# Patient Record
Sex: MALE | Race: WHITE
[De-identification: names, ages, dates, MRNs, and addresses within clinical notes are randomized per-mention and may not be internally consistent; named-entity substitution may affect disease eponyms.]

---

## 2019-01-05 ENCOUNTER — HOSPITAL ENCOUNTER (EMERGENCY)
Dept: HOSPITAL 10 - FTE | Age: 54
Discharge: HOME | End: 2019-01-05
Payer: COMMERCIAL

## 2019-01-05 ENCOUNTER — HOSPITAL ENCOUNTER (EMERGENCY)
Dept: HOSPITAL 91 - FTE | Age: 54
Discharge: HOME | End: 2019-01-05
Payer: COMMERCIAL

## 2019-01-05 VITALS
WEIGHT: 172.62 LBS | BODY MASS INDEX: 27.09 KG/M2 | HEIGHT: 67 IN | BODY MASS INDEX: 27.09 KG/M2 | HEIGHT: 67 IN | WEIGHT: 172.62 LBS

## 2019-01-05 VITALS — DIASTOLIC BLOOD PRESSURE: 91 MMHG | HEART RATE: 77 BPM | SYSTOLIC BLOOD PRESSURE: 150 MMHG | RESPIRATION RATE: 18 BRPM

## 2019-01-05 DIAGNOSIS — M54.42: Primary | ICD-10-CM

## 2019-01-05 PROCEDURE — 99284 EMERGENCY DEPT VISIT MOD MDM: CPT

## 2019-01-05 PROCEDURE — 96372 THER/PROPH/DIAG INJ SC/IM: CPT

## 2019-01-05 RX ADMIN — HYDROCODONE BITARTRATE AND ACETAMINOPHEN 1 TAB: 5; 325 TABLET ORAL at 10:52

## 2019-01-05 RX ADMIN — METHYLPREDNISOLONE SODIUM SUCCINATE 1 MG: 125 INJECTION, POWDER, FOR SOLUTION INTRAMUSCULAR; INTRAVENOUS at 10:52

## 2019-01-05 RX ADMIN — NAPROXEN 1 MG: 500 TABLET ORAL at 10:58

## 2019-01-05 RX ADMIN — DIAZEPAM 1 MG: 5 TABLET ORAL at 10:52

## 2019-01-05 NOTE — ERD
ER Documentation


Chief Complaint


Chief Complaint





lwer back chronic pain from an injry . worst in the past 3 days





HPI


53-year-old male with a history of chronic lower back pain presents ED with 


complaints of flareup of back pain over the past few days.  Patient states that 


he had an injury that occurred at work in  to his lower back and has had 


off-and-on pain over since.  Patient denies any recent fall or injury to account


for back pain today.  Rates pain at a 7 out of 10 and states that it radiates 


down the left posterior leg.  Admits to painful range of motion.  Denies 


decreased range of motion, tingling, numbness, lack sensation, fever, chills, 


bowel/bladder incontinence, weakness, saddle paresthesias history of IV drug 


abuse and all other symptoms.  No known drug allergies.





ROS


All systems reviewed and are negative except as per history of present illness.





Medications


Home Meds


Active Scripts


Naproxen* (Naprosyn*) 500 Mg Tablet, 500 MG PO BID PRN for PAIN AND/OR 


INFLAMMATION, #30 TAB


   Prov:NADEEM ASHBY PA-C         19


Methylprednisolone* (Medrol* DOSE PACK) 4 Mg/Dose-Pack Tab.ds.pk, 4 MG PO .AS 


DIRECTED for 5 Days, PACKET


   Prov:NADEEM ASHBY PA-C         19


Hydrocodone/Acetaminophen (Norco 5-325 Tablet) 1 Each Tablet, 1 TAB PO Q6H PRN 


for PAIN, #7 TAB


   Prov:NADEEM ASHBY PA-C         19


Cyclobenzaprine Hcl* (Cyclobenzaprine Hcl*) 10 Mg Tablet, 10 MG PO TID, #15 TAB


   Prov:NADEEM ASHBY PA-C         19





Allergies


Allergies:  


Coded Allergies:  


     No Known Allergy (Unverified , 19)





PMhx/Soc


History of Surgery:  No


Anesthesia Reaction:  No


Hx Neurological Disorder:  No


Hx Respiratory Disorders:  No


Hx Cardiac Disorders:  No


Hx Psychiatric Problems:  Yes (ANXIETY)


Hx Miscellaneous Medical Probl:  No


Hx Alcohol Use:  No


Hx Substance Use:  No


Hx Tobacco Use:  No





FmHx


Family History:  No diabetes





Physical Exam


Vitals





Vital Signs


  Date      Temp  Pulse  Resp  B/P (MAP)   Pulse Ox  O2          O2 Flow    FiO2


Time                                                 Delivery    Rate


    19  96.8     77    18      150/91        98


     10:22                          (110)





Physical Exam


Const:   No acute distress


Head:   Atraumatic 


Eyes:    Normal Conjunctiva


ENT:    Normal External Ears, Nose and Mouth.


Neck:               Full range of motion. No meningismus.


Resp:   Clear to auscultation bilaterally


Cardio:   Regular rate and rhythm, no murmurs


Back:   No midline or flank tenderness, there is mild tenderness palpation along


the paravertebral muscles in the lumbar spine, no step-off deformities, positive


straight leg raise on the left and side


Ext:    No cyanosis, or edema


Neur:   Awake and alert


Psych:    Normal Mood and Affect


Results 24 hrs





Current Medications


 Medications
   Dose
          Sig/Vernell
       Start Time
   Status  Last


 (Trade)       Ordered        Route
 PRN     Stop Time              Admin
Dose


                              Reason                                Admin


                125 mg         ONCE  ONCE
    19        DC            19


Methylprednis                 IM
            11:00
 19                10:52



olone
 Sodium                                11:01


Succinate



(Solu-Medrol)


                1 tab          ONCE  ONCE
    19        DC            19


Acetaminophen                 PO
            11:00
 19                10:52



/
                                           11:01


Hydrocodone


Bitart



(Norco


(5/325))


 Diazepam
      5 mg           ONCE  ONCE
    19        DC            19


(Valium)                      PO
            11:00
 19                10:52



                                             11:01


 Naproxen
      500 mg         ONCE  ONCE
    19        DC            19


(Naprosyn)                    PO
            11:00
 19                10:58



                                             11:01








Procedures/MDM





ER COURSE:


The patient was given Norco, Valium, naproxen, IM Solu-Medrol


The medication was well tolerated and the patient reports improvement in 


symptoms.  


The patient was stable throughout ED course.


I kept the patient and/or family informed of laboratory and diagnostic imaging 


results throughout the emergency room course.





The patient was promptly evaluated and a treatment plan was devised based on H&P


and other data. This plan was discussed with the patient who agreed and had no 


further questions or concerns prior to discharge.





MEDICAL DECISION MAKIN-year-old male presents ED with flareup of back pain times 3 days.  Given  


location of back pain being along the paravertebral muscles and positive strai


ght leg raise this is likely musculoskeletal.  Discussed possibility of 


herniated disc and advised patient follow-up with primary care physician to have


advanced imaging performed and to possibly see an orthopedic specialist . 


History and physical examination other data not consistent with processing 


including cauda equina syndrome, cord compression, infiltrative etiology, 


infectious etiology, epidural abscess, fracture, obstructive pyelonephritis, 


abdominal aortic aneurysm. Vitals are stable and patient can be managed 


outpatient with close follow-up. Advised patient to follow up with primary care 


in the next 48 hours. return to ED with any worsening symptoms


 








DISPOSITION PLAN:


We discussed follow up with the patient's primary care doctor within 24 to 48 


hours.  Patient counseled regarding my diagnostic impression and care plan. 


Prior to discharge all questions answered. Pt agrees with treatment plan and 


understands strict return precautions. Precautionary instructions provided 


including instructions to return to the ER if not improving or for any worsening


or changing symptoms or concerns.





SPECIALIST FOLLOW UP RECOMMENDED: ortho


Patient has been advised to follow up with primary care in 1-2 days.





Disclaimer: Inadvertent spelling and grammatical errors are likely due to 


EHR/dictation software use and do not reflect on the overall quality of patient 


care. Also, please note that the electronic time recorded on this note does not 


necessarily reflect the actual time of the patient encounter.





Blood Pressure Assessment: Patient's blood pressure was elevated (>120/80) but 


appears stable without evidence of hypertension emergency or urgency.  The 


patient was counseled about the risks of hypertension and urged to pursue 


outpatient monitoring and therapy within a week with their primary care 


physician.





Departure


Diagnosis:  


   Primary Impression:  


   Back pain


   Back pain location:  low back pain  Chronicity:  chronic  Back pain 


   laterality:  bilateral  Sciatica presence:  with sciatica  Sciatica lat


   erality:  sciatica of left side  Qualified Codes:  M54.42 - Lumbago with 


   sciatica, left side; G89.29 - Other chronic pain


Condition:  Stable


Patient Instructions:  Back Pain W/ Sciatica


Referrals:  


[facility]





Additional Instructions:  


Paciente aconseja volver a Departamento de urgencias inmediatamente para 


sntomas nuevos o que empeoran . Paciente aconseja posteriores con el PCP en 1-2


titus . Paciente verbaliza la comprehensin y est de acuerdo con el tratamiento 


y el curso de accin.





Si el paciente no tiene ninguna de atencin primaria pueden seguir con





[facility]


[address] [place] [address]





o





[facility]


[address] [place] [id number]











NADEEM ASHBY PA-C           2019 10:55

## 2019-06-03 ENCOUNTER — HOSPITAL ENCOUNTER (EMERGENCY)
Dept: HOSPITAL 91 - E/R | Age: 54
Discharge: HOME | End: 2019-06-03
Payer: COMMERCIAL

## 2019-06-03 ENCOUNTER — HOSPITAL ENCOUNTER (EMERGENCY)
Dept: HOSPITAL 10 - E/R | Age: 54
Discharge: HOME | End: 2019-06-03
Payer: COMMERCIAL

## 2019-06-03 VITALS — RESPIRATION RATE: 22 BRPM | DIASTOLIC BLOOD PRESSURE: 111 MMHG | SYSTOLIC BLOOD PRESSURE: 144 MMHG | HEART RATE: 86 BPM

## 2019-06-03 VITALS
WEIGHT: 172.84 LBS | BODY MASS INDEX: 27.13 KG/M2 | HEIGHT: 67 IN | WEIGHT: 172.84 LBS | HEIGHT: 67 IN | BODY MASS INDEX: 27.13 KG/M2

## 2019-06-03 DIAGNOSIS — K59.00: ICD-10-CM

## 2019-06-03 DIAGNOSIS — K57.32: Primary | ICD-10-CM

## 2019-06-03 LAB
ADD MAN DIFF?: NO
ADD UMIC: YES
ALANINE AMINOTRANSFERASE: 24 IU/L (ref 13–69)
ALBUMIN/GLOBULIN RATIO: 1.25
ALBUMIN: 4.9 G/DL (ref 3.3–4.9)
ALKALINE PHOSPHATASE: 118 IU/L (ref 42–121)
ANION GAP: 9 (ref 5–13)
ASPARTATE AMINO TRANSFERASE: 28 IU/L (ref 15–46)
BASOPHIL #: 0.1 10^3/UL (ref 0–0.1)
BASOPHILS %: 0.7 % (ref 0–2)
BILIRUBIN,DIRECT: 0 MG/DL (ref 0–0.2)
BILIRUBIN,TOTAL: 0.6 MG/DL (ref 0.2–1.3)
BLOOD UREA NITROGEN: 9 MG/DL (ref 7–20)
CALCIUM: 9.5 MG/DL (ref 8.4–10.2)
CARBON DIOXIDE: 30 MMOL/L (ref 21–31)
CHLORIDE: 104 MMOL/L (ref 97–110)
CREATININE: 0.77 MG/DL (ref 0.61–1.24)
EOSINOPHILS #: 0 10^3/UL (ref 0–0.5)
EOSINOPHILS %: 0.2 % (ref 0–7)
GLOBULIN: 3.9 G/DL (ref 1.3–3.2)
GLUCOSE: 114 MG/DL (ref 70–220)
HEMATOCRIT: 44.2 % (ref 42–52)
HEMOGLOBIN: 14.4 G/DL (ref 14–18)
IMMATURE GRANS #M: 0.03 10^3/UL (ref 0–0.03)
IMMATURE GRANS % (M): 0.3 % (ref 0–0.43)
LIPASE: 49 U/L (ref 23–300)
LYMPHOCYTES #: 2.2 10^3/UL (ref 0.8–2.9)
LYMPHOCYTES %: 23.3 % (ref 15–51)
MEAN CORPUSCULAR HEMOGLOBIN: 28.7 PG (ref 29–33)
MEAN CORPUSCULAR HGB CONC: 32.6 G/DL (ref 32–37)
MEAN CORPUSCULAR VOLUME: 88.2 FL (ref 82–101)
MEAN PLATELET VOLUME: 10.2 FL (ref 7.4–10.4)
MONOCYTE #: 0.5 10^3/UL (ref 0.3–0.9)
MONOCYTES %: 5.9 % (ref 0–11)
NEUTROPHIL #: 6.4 10^3/UL (ref 1.6–7.5)
NEUTROPHILS %: 69.6 % (ref 39–77)
NUCLEATED RED BLOOD CELLS #: 0 10^3/UL (ref 0–0)
NUCLEATED RED BLOOD CELLS%: 0 /100WBC (ref 0–0)
PLATELET COUNT: 301 10^3/UL (ref 140–415)
POTASSIUM: 4 MMOL/L (ref 3.5–5.1)
RED BLOOD COUNT: 5.01 10^6/UL (ref 4.7–6.1)
RED CELL DISTRIBUTION WIDTH: 13 % (ref 11.5–14.5)
SODIUM: 143 MMOL/L (ref 135–144)
TOTAL PROTEIN: 8.8 G/DL (ref 6.1–8.1)
UR ASCORBIC ACID: NEGATIVE MG/DL
UR BILIRUBIN (DIP): NEGATIVE MG/DL
UR BLOOD (DIP): (no result) MG/DL
UR CLARITY: CLEAR
UR COLOR: YELLOW
UR GLUCOSE (DIP): NEGATIVE MG/DL
UR KETONES (DIP): NEGATIVE MG/DL
UR LEUKOCYTE ESTERASE (DIP): NEGATIVE LEU/UL
UR MUCUS: (no result) /HPF
UR NITRITE (DIP): NEGATIVE MG/DL
UR PH (DIP): 5 (ref 5–9)
UR RBC: 1 /HPF (ref 0–5)
UR SPECIFIC GRAVITY (DIP): 1.02 (ref 1–1.03)
UR TOTAL PROTEIN (DIP): NEGATIVE MG/DL
UR UROBILINOGEN (DIP): NEGATIVE MG/DL
UR WBC: 1 /HPF (ref 0–5)
WHITE BLOOD COUNT: 9.2 10^3/UL (ref 4.8–10.8)

## 2019-06-03 PROCEDURE — 80053 COMPREHEN METABOLIC PANEL: CPT

## 2019-06-03 PROCEDURE — 36415 COLL VENOUS BLD VENIPUNCTURE: CPT

## 2019-06-03 PROCEDURE — 74176 CT ABD & PELVIS W/O CONTRAST: CPT

## 2019-06-03 PROCEDURE — 96374 THER/PROPH/DIAG INJ IV PUSH: CPT

## 2019-06-03 PROCEDURE — 83690 ASSAY OF LIPASE: CPT

## 2019-06-03 PROCEDURE — 85025 COMPLETE CBC W/AUTO DIFF WBC: CPT

## 2019-06-03 PROCEDURE — 99285 EMERGENCY DEPT VISIT HI MDM: CPT

## 2019-06-03 PROCEDURE — 81001 URINALYSIS AUTO W/SCOPE: CPT

## 2019-06-03 PROCEDURE — 96375 TX/PRO/DX INJ NEW DRUG ADDON: CPT

## 2019-06-03 RX ADMIN — ONDANSETRON HYDROCHLORIDE 1 MG: 2 INJECTION, SOLUTION INTRAMUSCULAR; INTRAVENOUS at 21:15

## 2019-06-03 RX ADMIN — THIAMINE HYDROCHLORIDE 1 MLS/HR: 100 INJECTION, SOLUTION INTRAMUSCULAR; INTRAVENOUS at 21:16

## 2019-06-03 NOTE — ERD
ER Documentation


Chief Complaint


Chief Complaint





abd pain radiating to back +n/v/d since last night.





HPI


54-year-old male presenting with lower abdominal pain since last night with 


associated nausea and nonbloody and nonbilious vomiting.  Denies any associated 


fever, chills, diarrhea, but does endorse some constipation.  Currently the pain


is constant, radiating to his lower back, 8 out of 10, with no alleviating 


factors.  Exacerbated by walking and movement.





ROS


All systems reviewed and are negative except as per history of present illness.





Medications


Home Meds


Active Scripts


Ondansetron (Ondansetron Odt) 4 Mg Tab.rapdis, 4 MG PO Q6H PRN for NAUSEA AND/OR


VOMITING, #10 TAB


   Prov:TITI COHEN MD         6/3/19


Metronidazole* (Flagyl*) 500 Mg Tablet, 500 MG PO TID for 10 Days, TAB


   Prov:TITI COHEN MD         6/3/19


Ciprofloxacin Hcl* (Ciprofloxacin Hcl*) 500 Mg Tablet, 500 MG PO BID for 10 


Days, TAB


   Prov:TITI COHEN MD         6/3/19


Polyethylene Glycol* (Miralax*) 17 Gm Powd.pack, 17 GM PO DAILY, #7


   Prov:TITI COHEN MD         6/3/19


Cyclobenzaprine Hcl* (Cyclobenzaprine Hcl*) 10 Mg Tablet, 10 MG PO TID, #15 TAB


   Prov:NADEEM ASHBY PA-C         1/5/19


Discontinued Scripts


Naproxen* (Naprosyn*) 500 Mg Tablet, 500 MG PO BID PRN for PAIN AND/OR INF


LAMMATION, #30 TAB


   Prov:NADEEM ASHBY PA-C         1/5/19


Methylprednisolone* (Medrol* DOSE PACK) 4 Mg/Dose-Pack Tab.ds.pk, 4 MG PO .AS 


DIRECTED for 5 Days, PACKET


   Prov:NADEEM ASHBY PA-C         1/5/19


Hydrocodone/Acetaminophen (Norco 5-325 Tablet) 1 Each Tablet, 1 TAB PO Q6H PRN 


for PAIN, #7 TAB


   Prov:NADEEM ASHBY PA-C         1/5/19





Allergies


Allergies:  


Coded Allergies:  


     No Known Allergy (Unverified , 1/5/19)





PMhx/Soc


History of Surgery:  Yes (Shoulder surgery)


Anesthesia Reaction:  No


Hx Neurological Disorder:  No


Hx Respiratory Disorders:  No


Hx Cardiac Disorders:  No


Hx Psychiatric Problems:  Yes (ANXIETY, depression)


Hx Miscellaneous Medical Probl:  No


Hx Alcohol Use:  No


Hx Substance Use:  No


Hx Tobacco Use:  No


Smoking Status:  Never smoker





FmHx


Family History:  No diabetes





Physical Exam


Vitals





Vital Signs


  Date      Temp  Pulse  Resp  B/P (MAP)   Pulse Ox  O2          O2 Flow    FiO2


Time                                                 Delivery    Rate


    6/3/19  98.4     86    22     144/111        97  Room Air


     23:20                          (122)


    6/3/19           55    15      166/99       100  Room Air


     22:49                          (121)


    6/3/19  98.1     62    16     162/106        97


     18:26                          (124)





Physical Exam


Const:   No acute distress


Head:   Atraumatic 


Eyes:    Normal Conjunctiva


ENT:    Normal External Ears, Nose and Mouth.


Neck:               Full range of motion. No meningismus.


Resp:   Clear to auscultation bilaterally


Cardio:   Regular rate and rhythm, no murmurs


Abd:    Soft, diffuse lower abdominal tenderness to palpation with no rebound or


guarding.  Non distended. Normal bowel sounds


Skin:   No petechiae or rashes


Back:   No midline or flank tenderness


Ext:    No cyanosis, or edema


Neur:   Awake and alert


Psych:    Normal Mood and Affect


Result Diagram:  


6/3/19 2114                                                                     


          6/3/19 2114





Results 24 hrs





Laboratory Tests


      Test
                                   6/3/19
20:08    6/3/19
21:14


      Urine Color                          YELLOW


      Urine Clarity                        CLEAR


      Urine pH                                        5.0


      Urine Specific Gravity                        1.018


      Urine Ketones                        NEGATIVE mg/dL


      Urine Nitrite                        NEGATIVE mg/dL


      Urine Bilirubin                      NEGATIVE mg/dL


      Urine Urobilinogen                   NEGATIVE mg/dL


      Urine Leukocyte Esterase
            NEGATIVE
Philip/ul  



      Urine Microscopic RBC                        1 /HPF


      Urine Microscopic WBC                        1 /HPF


      Urine Mucus                          MODERATE /HPF


      Urine Hemoglobin                           1+ mg/dL


      Urine Glucose                        NEGATIVE mg/dL


      Urine Total Protein                  NEGATIVE mg/dl


      White Blood Count                                       9.2 10^3/ul


      Red Blood Count                                        5.01 10^6/ul


      Hemoglobin                                                14.4 g/dl


      Hematocrit                                                   44.2 %


      Mean Corpuscular Volume                                     88.2 fl


      Mean Corpuscular Hemoglobin                                 28.7 pg


      Mean Corpuscular Hemoglobin
Concent  
                   32.6 g/dl 



      Red Cell Distribution Width                                  13.0 %


      Platelet Count                                          301 10^3/UL


      Mean Platelet Volume                                        10.2 fl


      Immature Granulocytes %                                     0.300 %


      Neutrophils %                                                69.6 %


      Lymphocytes %                                                23.3 %


      Monocytes %                                                   5.9 %


      Eosinophils %                                                 0.2 %


      Basophils %                                                   0.7 %


      Nucleated Red Blood Cells %                             0.0 /100WBC


      Immature Granulocytes #                               0.030 10^3/ul


      Neutrophils #                                           6.4 10^3/ul


      Lymphocytes #                                           2.2 10^3/ul


      Monocytes #                                             0.5 10^3/ul


      Eosinophils #                                           0.0 10^3/ul


      Basophils #                                             0.1 10^3/ul


      Nucleated Red Blood Cells #                             0.0 10^3/ul


      Sodium Level                                             143 mmol/L


      Potassium Level                                          4.0 mmol/L


      Chloride Level                                           104 mmol/L


      Carbon Dioxide Level                                      30 mmol/L


      Anion Gap                                                         9


      Blood Urea Nitrogen                                         9 mg/dl


      Creatinine                                               0.77 mg/dl


      Est Glomerular Filtrat Rate
mL/min   
                > 60 mL/min 



      Glucose Level                                             114 mg/dl


      Calcium Level                                             9.5 mg/dl


      Total Bilirubin                                           0.6 mg/dl


      Direct Bilirubin                                         0.00 mg/dl


      Indirect Bilirubin                                        0.6 mg/dl


      Aspartate Amino Transf
(AST/SGOT)    
                     28 IU/L 



      Alanine Aminotransferase
(ALT/SGPT)  
                     24 IU/L 



      Alkaline Phosphatase                                       118 IU/L


      Total Protein                                              8.8 g/dl


      Albumin                                                    4.9 g/dl


      Globulin                                                  3.90 g/dl


      Albumin/Globulin Ratio                                         1.25


      Lipase                                                       49 U/L





Current Medications


 Medications
   Dose
          Sig/Vernell
       Start Time
   Status  Last


 (Trade)       Ordered        Route
 PRN     Stop Time              Admin
Dose


                              Reason                                Admin


 Sodium         1,000 ml @ 
   Q1H STAT
      6/3/19        DC            6/3/19


Chloride       1,000 mls/hr   IV
            21:06
 6/3/19                21:16



                                             22:05


 Morphine       4 mg           ONCE  STAT
    6/3/19        DC            6/3/19


Sulfate
                      IV
            21:06
 6/3/19                21:15



(morphine)                                   21:08


 Ondansetron    4 mg           ONCE  STAT
    6/3/19        DC            6/3/19


HCl
  (Zofran                 IV
            21:06
 6/3/19                21:15



Inj)                                         21:08








Procedures/MDM


EMERGENT LABS AND DIAGNOSTIC STUDIES: 


Lab Results above were reviewed and interpreted by me. 





CBC: no anemia or evidence of infection


CMP: No evidence of clinically significant electrolyte abnormality, acidosis, re


nal failure, hypoglycemia, liver disease, or biliary obstruction


UA shows microscopic hematuria, no evidence of acute infection


___________________________________________________________ 


Radiology Results as interpreted by Radiology below were reviewed by CLARITA Cohen MD: 





CT abdomen and pelvis:


IMPRESSION:


1. Mild left colonic diverticulosis with subtle findings which could reflect 


early acute diverticulitis at the distal descending colon. The bowel is 


unobstructed without evidence of perforation or abscess, and the appendix 


appears normal.


2. Punctate nonobstructive left nephrolithiasis without hydronephrosis.


3. Mild prostatomegaly impressing on the bladder neck. Correlation with clinical


exam and PSA values recommended.


4. Redemonstrated mild multilevel spondylotic changes.


  





___________________________________________________________ 


Initial Nursing notes reviewed. 


Previous Medical Records requested via the Electronic Health Record. 





EMERGENCY DEPARTMENT COURSE / MEDICAL DECISION MAKING: 





Patient is presenting with lower abdominal pain with nausea and vomiting but no 


other related symptoms.  He is afebrile and hemodynamically stable.  CT shows 


evidence of diverticulitis.  Recommended starting treatment with oral 


antibiotics, dual therapy with Flagyl and Cipro.  Patient tolerating fluids by 


mouth.  Pain well controlled.  He is stable for outpatient treatment.  Return 


precautions discussed.  Importance of following up with his primary care doctor 


for outpatient colonoscopy discussed after treatment complete.








Patient's blood pressure was elevated (>120/80) but appears stable without 


evidence of hypertensive emergency or urgency. The patient was counseled about 


the risks of hypertension and urged to pursue outpatient monitoring and therapy 


within a week with their primary care physician.





Departure


Diagnosis:  


   Primary Impression:  


   Diverticulitis


Condition:  Stable











TITI COHEN MD          Galo 3, 2019 21:07